# Patient Record
Sex: FEMALE | URBAN - NONMETROPOLITAN AREA
[De-identification: names, ages, dates, MRNs, and addresses within clinical notes are randomized per-mention and may not be internally consistent; named-entity substitution may affect disease eponyms.]

---

## 2023-06-06 ENCOUNTER — APPOINTMENT (RX ONLY)
Dept: URBAN - NONMETROPOLITAN AREA CLINIC 13 | Facility: CLINIC | Age: 53
Setting detail: DERMATOLOGY
End: 2023-06-06

## 2023-06-06 DIAGNOSIS — Z41.9 ENCOUNTER FOR PROCEDURE FOR PURPOSES OTHER THAN REMEDYING HEALTH STATE, UNSPECIFIED: ICD-10-CM

## 2023-06-06 PROCEDURE — ? ADDITIONAL NOTES

## 2023-06-06 PROCEDURE — ? BOTOX

## 2023-06-06 NOTE — PROCEDURE: ADDITIONAL NOTES
Detail Level: Simple
Additional Notes: Patient was seen by Dr. Alvarez.
Render Risk Assessment In Note?: no

## 2023-06-06 NOTE — PROCEDURE: BOTOX
Show Additional Area 2: Yes
Additional Area 4 Units: 0
Lot #: 
Show Lcl Units: No
Additional Area 3 Location: chin
Dilution (U/0.1 Cc): 1
Additional Area 2 Location: lip upper
Additional Area 6 Location: brow
Incrementing Botox Units: By 0.5 Units
Additional Area 5 Location: [facility]
Detail Level: Zone
Consent: Written consent obtained. Risks include but not limited to lid/brow ptosis, bruising, swelling, diplopia, temporary effect, incomplete chemical denervation.
Additional Area 4 Location: under lower lash lines
Expiration Date (Month Year): 12/25
Post-Care Instructions: Patient instructed to not lie down for 4 hours and limit physical activity for 24 hours. Patient instructed not to travel by airplane for 48 hours.
Inferior Lateral Orbicularis Oculi Units: 24
Price (Use Numbers Only, No Special Characters Or $): 312

## 2023-08-31 ENCOUNTER — APPOINTMENT (RX ONLY)
Dept: URBAN - NONMETROPOLITAN AREA CLINIC 13 | Facility: CLINIC | Age: 53
Setting detail: DERMATOLOGY
End: 2023-08-31

## 2023-08-31 DIAGNOSIS — Z41.9 ENCOUNTER FOR PROCEDURE FOR PURPOSES OTHER THAN REMEDYING HEALTH STATE, UNSPECIFIED: ICD-10-CM

## 2023-08-31 PROCEDURE — ? DYSPORT

## 2023-08-31 PROCEDURE — ? COSMETIC CONSULTATION: BOTULINUM TOXIN

## 2023-08-31 PROCEDURE — ? ADDITIONAL NOTES

## 2023-08-31 NOTE — PROCEDURE: DYSPORT
Detail Level: Detailed
Depressor Anguli Oris Units: 0
Show Masseter Units: Yes
Additional Area 2 Location: R lateral forehead
Price (Use Numbers Only, No Special Characters Or $): 246
Show Right And Left Brow Units: No
Additional Area 6 Location: under lower lash line
Additional Area 3 Location: lip
Consent: Written consent obtained. Risks include but not limited to lid/brow ptosis, bruising, swelling, diplopia, temporary effect, incomplete chemical denervation.
Lot #: [id number]
Post-Care Instructions: Patient instructed to not lie down for 4 hours and limit physical activity for 24 hours. Patient instructed not to travel by airplane for 48 hours.
Additional Area 4 Location: chin
Dilution (U/0.1 Cc): 1.5
Additional Area 1 Location: periorbital
Glabellar Complex Units: 20
Expiration Date (Month Year): 2/28/25
Inferior Lateral Orbicularis Oculi Units: 50
Additional Area 5 Location: brow

## 2023-08-31 NOTE — PROCEDURE: ADDITIONAL NOTES
Detail Level: Simple
Render Risk Assessment In Note?: no
Additional Notes: Dr. Jackson was consulted and she agrees with treatment plan as per written protocols.

## 2023-11-21 ENCOUNTER — APPOINTMENT (RX ONLY)
Dept: URBAN - NONMETROPOLITAN AREA CLINIC 13 | Facility: CLINIC | Age: 53
Setting detail: DERMATOLOGY
End: 2023-11-21

## 2023-11-21 DIAGNOSIS — Z41.9 ENCOUNTER FOR PROCEDURE FOR PURPOSES OTHER THAN REMEDYING HEALTH STATE, UNSPECIFIED: ICD-10-CM

## 2023-11-21 PROCEDURE — ? BOTOX

## 2023-11-21 NOTE — PROCEDURE: BOTOX
Nasal Root Units: 0
Glabellar Complex Units: 10
Show Additional Area 2: Yes
Post-Care Instructions: Patient instructed to not lie down for 4 hours and limit physical activity for 24 hours. Patient instructed not to travel by airplane for 48 hours.
Show Lcl Units: No
Additional Area 2 Location: lip upper
Inferior Lateral Orbicularis Oculi Units: 30
Additional Area 1 Location: lateral  brow
Additional Area 5 Location: [facility]
Additional Area 4 Location: under lower lash line
Expiration Date (Month Year): 4/26
Lot #: 
Price (Use Numbers Only, No Special Characters Or $): 708
Incrementing Botox Units: By 0.5 Units
Additional Area 3 Location: chin
Additional Area 6 Location: lateral brow
Detail Level: Zone
Consent: Written consent obtained. Risks include but not limited to lid/brow ptosis, bruising, swelling, diplopia, temporary effect, incomplete chemical denervation.
Dilution (U/0.1 Cc): 1

## 2024-02-20 ENCOUNTER — APPOINTMENT (RX ONLY)
Dept: URBAN - NONMETROPOLITAN AREA CLINIC 13 | Facility: CLINIC | Age: 54
Setting detail: DERMATOLOGY
End: 2024-02-20

## 2024-02-20 DIAGNOSIS — Z41.9 ENCOUNTER FOR PROCEDURE FOR PURPOSES OTHER THAN REMEDYING HEALTH STATE, UNSPECIFIED: ICD-10-CM

## 2024-02-20 PROCEDURE — ? BOTOX

## 2024-02-20 NOTE — PROCEDURE: BOTOX
Lateral Platysmal Bands Units: 0
Additional Area 1 Location: lateral  brows
Dilution (U/0.1 Cc): 1
Show Additional Area 3: Yes
Show Mentalis Units: No
Consent: Written consent obtained. Risks include but not limited to lid/brow ptosis, bruising, swelling, diplopia, temporary effect, incomplete chemical denervation.
Glabellar Complex Units: 10
Additional Area 5 Location: lateral brows
Post-Care Instructions: Patient instructed to not lie down for 4 hours and limit physical activity for 24 hours. Patient instructed not to travel by airplane for 48 hours.
Inferior Lateral Orbicularis Oculi Units: 30
Additional Area 4 Location: under lower lash line
Incrementing Botox Units: By 0.5 Units
Additional Area 3 Location: chin
Lot #: [id number]
Expiration Date (Month Year): 4/26
Price (Use Numbers Only, No Special Characters Or $): 604
Additional Area 2 Location: lip upper
Detail Level: Zone

## 2024-05-21 ENCOUNTER — APPOINTMENT (RX ONLY)
Dept: URBAN - NONMETROPOLITAN AREA CLINIC 13 | Facility: CLINIC | Age: 54
Setting detail: DERMATOLOGY
End: 2024-05-21

## 2024-05-21 DIAGNOSIS — Z41.9 ENCOUNTER FOR PROCEDURE FOR PURPOSES OTHER THAN REMEDYING HEALTH STATE, UNSPECIFIED: ICD-10-CM

## 2024-05-21 PROCEDURE — ? BOTOX

## 2024-09-04 ENCOUNTER — APPOINTMENT (RX ONLY)
Dept: URBAN - NONMETROPOLITAN AREA CLINIC 13 | Facility: CLINIC | Age: 54
Setting detail: DERMATOLOGY
End: 2024-09-04

## 2024-09-04 DIAGNOSIS — Z41.9 ENCOUNTER FOR PROCEDURE FOR PURPOSES OTHER THAN REMEDYING HEALTH STATE, UNSPECIFIED: ICD-10-CM

## 2024-09-04 PROCEDURE — ? MEDICAL CONSULTATION: FILLERS

## 2024-09-04 PROCEDURE — ? ADDITIONAL NOTES

## 2024-09-04 PROCEDURE — ? COSMETIC CONSULTATION: BOTULINUM TOXIN

## 2024-09-04 PROCEDURE — ? BOTOX

## 2024-12-10 ENCOUNTER — APPOINTMENT (OUTPATIENT)
Dept: URBAN - NONMETROPOLITAN AREA CLINIC 13 | Facility: CLINIC | Age: 54
Setting detail: DERMATOLOGY
End: 2024-12-10

## 2024-12-10 DIAGNOSIS — Z41.9 ENCOUNTER FOR PROCEDURE FOR PURPOSES OTHER THAN REMEDYING HEALTH STATE, UNSPECIFIED: ICD-10-CM

## 2024-12-10 PROCEDURE — ? BOTOX

## 2025-03-10 ENCOUNTER — APPOINTMENT (OUTPATIENT)
Dept: URBAN - NONMETROPOLITAN AREA CLINIC 13 | Facility: CLINIC | Age: 55
Setting detail: DERMATOLOGY
End: 2025-03-10

## 2025-03-10 DIAGNOSIS — Z41.9 ENCOUNTER FOR PROCEDURE FOR PURPOSES OTHER THAN REMEDYING HEALTH STATE, UNSPECIFIED: ICD-10-CM

## 2025-03-10 PROCEDURE — ? ADDITIONAL NOTES

## 2025-03-10 PROCEDURE — ? BOTOX

## 2025-03-10 NOTE — PROCEDURE: ADDITIONAL NOTES
Render Risk Assessment In Note?: no
Detail Level: Simple
Additional Notes: Error in charting for previous visit. 30 units of Botox was injected into crows feet , not glabella.

## 2025-03-10 NOTE — PROCEDURE: BOTOX
Inferior Lateral Orbicularis Oculi Units: 30
Mentalis Units: 0
Show Levator Superior Units: Yes
Post-Care Instructions: Patient instructed to not lie down for 4 hours and limit physical activity for 24 hours. Patient instructed not to travel by airplane for 48 hours.
Show Lcl Units: No
Additional Area 5 Location: chin
Additional Area 4 Location: under lower lash line
Expiration Date (Month Year): 4/27
Incrementing Botox Units: By 0.5 Units
Price (Use Numbers Only, No Special Characters Or $): 420
Additional Area 1 Location: lateral  brows
Lot #: [id number]
Additional Area 3 Location: ELDON's 4u R side 3 u L side
Detail Level: Zone
Dilution (U/0.1 Cc): 1
Additional Area 2 Location: lip upper
Additional Area 6 Location: Lateral brow
Consent: Written consent obtained. Risks include but not limited to lid/brow ptosis, bruising, swelling, diplopia, temporary effect, incomplete chemical denervation.

## 2025-04-01 ENCOUNTER — APPOINTMENT (OUTPATIENT)
Dept: URBAN - NONMETROPOLITAN AREA CLINIC 13 | Facility: CLINIC | Age: 55
Setting detail: DERMATOLOGY
End: 2025-04-01

## 2025-04-01 DIAGNOSIS — Z41.9 ENCOUNTER FOR PROCEDURE FOR PURPOSES OTHER THAN REMEDYING HEALTH STATE, UNSPECIFIED: ICD-10-CM

## 2025-04-01 PROCEDURE — ? JUVEDERM VOLUMA XC INJECTION

## 2025-04-01 PROCEDURE — ? JUVEDERM ULTRA INJECTION

## 2025-04-01 NOTE — PROCEDURE: JUVEDERM VOLUMA XC INJECTION
Decollete Filler Volume In Cc: 0
Include Cannula Length?: 1 inch
Additional Area 4 Location: lateral tear troughs
Additional Area 2 Location: R cheek and lateral face
Use Map Statement For Sites (Optional): No
Cheeks Filler Volume In Cc: 1
Post-Care Instructions: Patient instructed to apply ice to reduce swelling. Call with any concerns.
Detail Level: Detailed
Procedural Text: The filler was administered to the treatment areas noted above. .Lateral face/ temples was injected with cannula.\\n.
Additional Anesthesia Volume In Cc: 0.2
Filler: Juvederm Voluma XC
Lot #: [id number]
Anesthesia Volume In Cc: 0.5
Additional Area 3 Location: lower face rhytides
Consent: Written consent obtained. Risks include but not limited to bruising, beading, irregular texture, ulceration, infection, allergic reaction, scar formation, incomplete augmentation, temporary nature, procedural pain.
Include Cannula Size?: 25G
Additional Area 1 Location: jawline, chin
Map Statment: See Attach Map for Complete Details
Additional Anesthesia Type: 0.1% lidocaine with 1:1,000,000 epinephrine (tumescent anesthesia)
Price (Use Numbers Only, No Special Characters Or $): 5340
Expiration Date (Month Year): 2/25
Additional Area 5 Location: L cheek touch up

## 2025-04-01 NOTE — PROCEDURE: JUVEDERM ULTRA INJECTION
Brows Filler Volume In Cc: 0
Map Statement: See Attach Map for Complete Details
Marionette Lines Filler Volume In Cc: 0.8
Detail Level: Detailed
Filler: Juvederm Ultra
Lot #: [id number]
Additional Area 2 Location: corners of the mouth
Procedural Text: The filler was administered to the treatment areas noted above.
Additional Area 4 Location: vermillion border
Additional Area 5 Location: bimal oral rhytides
Nasolabial Folds Filler Volume In Cc: 0.2
Consent: Written consent obtained. Risks include but not limited to bruising, beading, irregular texture, ulceration, infection, allergic reaction, scar formation, incomplete augmentation, temporary nature, procedural pain.
Include Cannula Information In Note?: No
Number Of Syringes (Required For Inventory): 1
Expiration Date (Month Year): 3/26
Additional Area 1 Location: lower lip
Post-Care Instructions: Patient instructed to apply ice to reduce swelling.
Additional Area 3 Location: lower face  rhytides

## 2025-06-11 ENCOUNTER — APPOINTMENT (OUTPATIENT)
Dept: URBAN - NONMETROPOLITAN AREA CLINIC 13 | Facility: CLINIC | Age: 55
Setting detail: DERMATOLOGY
End: 2025-06-11

## 2025-06-11 DIAGNOSIS — Z41.9 ENCOUNTER FOR PROCEDURE FOR PURPOSES OTHER THAN REMEDYING HEALTH STATE, UNSPECIFIED: ICD-10-CM

## 2025-06-11 PROCEDURE — ? BOTOX

## 2025-06-11 NOTE — PROCEDURE: BOTOX
Periorbital Skin Units: 0
Incrementing Botox Units: By 0.5 Units
Post-Care Instructions: Patient instructed to not lie down for 4 hours and limit physical activity for 24 hours. Patient instructed not to travel by airplane for 48 hours.
Show Additional Area 3: Yes
Show Lcl Units: No
Additional Area 5 Location: chin
Additional Area 1 Location: lateral forehead lines
Detail Level: Zone
Glabellar Complex Units: 10
Additional Area 4 Location: under lower lash line
Expiration Date (Month Year): 6/27
Inferior Lateral Orbicularis Oculi Units: 30
Lot #: [id number]
Price (Use Numbers Only, No Special Characters Or $): 716
Additional Area 3 Location: [facility]
Consent: Written consent obtained. Risks include but not limited to lid/brow ptosis, bruising, swelling, diplopia, temporary effect, incomplete chemical denervation.
Dilution (U/0.1 Cc): 1
Additional Area 2 Location: lip upper
Additional Area 6 Location: Lateral brow